# Patient Record
(demographics unavailable — no encounter records)

---

## 2025-01-25 NOTE — HISTORY OF PRESENT ILLNESS
[EENT/Resp Symptoms] : EENT/RESPIRATORY SYMPTOMS [___ Day(s)] : [unfilled] day(s) [Constant] : constant [Max Temp: ____] : Max temperature: [unfilled] [Sore Throat] : sore throat [Stable] : stable [Sick Contacts: ___] : no sick contacts [Fever] : no fever [Ear Pain] : no ear pain [Rhinorrhea] : no rhinorrhea [Nasal Congestion] : no nasal congestion [Cough] : no cough [Wheezing] : no wheezing [Shortness of Breath] : no shortness of breath [Tachypnea] : no tachypnea [Decreased Appetite] : no decreased appetite [Vomiting] : no vomiting [Diarrhea] : no diarrhea [Decreased Urine Output] : no decreased urine output

## 2025-01-25 NOTE — DISCUSSION/SUMMARY
[FreeTextEntry1] : 6 year girl found to be rapid strep positive. Complete 10 days of antibiotics. Use antipyretics as needed. After being on antibiotics for at least 24 hours patient less likely to spread infection. RTC for worsening or persistent symptoms.

## 2025-03-12 NOTE — HISTORY OF PRESENT ILLNESS
[EENT/Resp Symptoms] : EENT/RESPIRATORY SYMPTOMS [___ Day(s)] : [unfilled] day(s) [Constant] : constant [Fever] : fever [Sore Throat] : sore throat [Stable] : stable [Ear Pain] : no ear pain [Rhinorrhea] : no rhinorrhea [Nasal Congestion] : no nasal congestion [Cough] : no cough [Tachypnea] : no tachypnea [Decreased Appetite] : no decreased appetite [Vomiting] : no vomiting [Diarrhea] : no diarrhea [Rash] : no rash [FreeTextEntry9] : fever at school

## 2025-03-12 NOTE — DISCUSSION/SUMMARY
[FreeTextEntry1] : Symptoms likely due to viral URI. Rapid strep neg, throat cx sent. Recommend supportive care including antipyretics, fluids, and nasal saline followed by nasal suction. Return if symptoms worsen or persist.

## 2025-07-28 NOTE — HISTORY OF PRESENT ILLNESS
[EENT/Resp Symptoms] : EENT/RESPIRATORY SYMPTOMS [Ear Pain] : ear pain [___ Day(s)] : [unfilled] day(s) [Intermittent] : intermittent [Active] : active [Fever] : no fever [Diarrhea] : no diarrhea [FreeTextEntry1] : 2

## 2025-07-28 NOTE — PHYSICAL EXAM
[Pain with manipulation of pinna] : pain with manipulation of pinna [Inflammation of canal] : inflammation of canal [Erythema of canal] : erythema of canal [Left] : (left) [NL] : warm, clear

## 2025-07-28 NOTE — DISCUSSION/SUMMARY
[FreeTextEntry1] : Ear drops are usually prescribed to reduce pain and swelling caused by external otitis. It is important to apply the ear drops correctly so that they reach the ear canal: -Lie on patient side or tilt head towards the opposite shoulder. -Place the ear drops in the ear canal. -Lie on side for [address] a cotton ball in the ear canal for 20 minutes. During treatment, avoid getting the inside of ears wet. While bathing, place a cotton ball coated with petroleum jelly in the ear. Do not swim for 7 to 10 days after starting treatment.